# Patient Record
Sex: FEMALE | Race: WHITE | NOT HISPANIC OR LATINO
[De-identification: names, ages, dates, MRNs, and addresses within clinical notes are randomized per-mention and may not be internally consistent; named-entity substitution may affect disease eponyms.]

---

## 2017-01-27 ENCOUNTER — OTHER (OUTPATIENT)
Age: 72
End: 2017-01-27

## 2017-01-29 ENCOUNTER — FORM ENCOUNTER (OUTPATIENT)
Age: 72
End: 2017-01-29

## 2017-01-30 ENCOUNTER — OUTPATIENT (OUTPATIENT)
Dept: OUTPATIENT SERVICES | Facility: HOSPITAL | Age: 72
LOS: 1 days | End: 2017-01-30
Payer: MEDICARE

## 2017-01-30 ENCOUNTER — APPOINTMENT (OUTPATIENT)
Dept: MAMMOGRAPHY | Facility: CLINIC | Age: 72
End: 2017-01-30

## 2017-01-30 ENCOUNTER — APPOINTMENT (OUTPATIENT)
Dept: ULTRASOUND IMAGING | Facility: CLINIC | Age: 72
End: 2017-01-30

## 2017-01-30 DIAGNOSIS — N60.09 SOLITARY CYST OF UNSPECIFIED BREAST: Chronic | ICD-10-CM

## 2017-01-30 DIAGNOSIS — Z90.49 ACQUIRED ABSENCE OF OTHER SPECIFIED PARTS OF DIGESTIVE TRACT: Chronic | ICD-10-CM

## 2017-01-30 DIAGNOSIS — Z00.8 ENCOUNTER FOR OTHER GENERAL EXAMINATION: ICD-10-CM

## 2017-01-30 DIAGNOSIS — Z90.710 ACQUIRED ABSENCE OF BOTH CERVIX AND UTERUS: Chronic | ICD-10-CM

## 2017-01-30 PROCEDURE — 76641 ULTRASOUND BREAST COMPLETE: CPT

## 2017-01-30 PROCEDURE — 77067 SCR MAMMO BI INCL CAD: CPT

## 2017-01-30 PROCEDURE — 77063 BREAST TOMOSYNTHESIS BI: CPT

## 2017-01-31 ENCOUNTER — FORM ENCOUNTER (OUTPATIENT)
Age: 72
End: 2017-01-31

## 2017-01-31 ENCOUNTER — OTHER (OUTPATIENT)
Age: 72
End: 2017-01-31

## 2017-02-01 ENCOUNTER — APPOINTMENT (OUTPATIENT)
Dept: MAMMOGRAPHY | Facility: CLINIC | Age: 72
End: 2017-02-01

## 2017-02-01 ENCOUNTER — APPOINTMENT (OUTPATIENT)
Dept: ULTRASOUND IMAGING | Facility: CLINIC | Age: 72
End: 2017-02-01

## 2017-02-01 ENCOUNTER — OUTPATIENT (OUTPATIENT)
Dept: OUTPATIENT SERVICES | Facility: HOSPITAL | Age: 72
LOS: 1 days | End: 2017-02-01
Payer: MEDICARE

## 2017-02-01 DIAGNOSIS — Z90.710 ACQUIRED ABSENCE OF BOTH CERVIX AND UTERUS: Chronic | ICD-10-CM

## 2017-02-01 DIAGNOSIS — Z90.49 ACQUIRED ABSENCE OF OTHER SPECIFIED PARTS OF DIGESTIVE TRACT: Chronic | ICD-10-CM

## 2017-02-01 DIAGNOSIS — N60.09 SOLITARY CYST OF UNSPECIFIED BREAST: Chronic | ICD-10-CM

## 2017-02-01 DIAGNOSIS — R92.8 OTHER ABNORMAL AND INCONCLUSIVE FINDINGS ON DIAGNOSTIC IMAGING OF BREAST: ICD-10-CM

## 2017-02-01 PROCEDURE — 76642 ULTRASOUND BREAST LIMITED: CPT

## 2017-02-01 PROCEDURE — 77065 DX MAMMO INCL CAD UNI: CPT

## 2017-02-01 PROCEDURE — G0279: CPT

## 2017-02-02 ENCOUNTER — OTHER (OUTPATIENT)
Age: 72
End: 2017-02-02

## 2017-02-15 ENCOUNTER — RESULT REVIEW (OUTPATIENT)
Age: 72
End: 2017-02-15

## 2017-02-15 ENCOUNTER — APPOINTMENT (OUTPATIENT)
Dept: BREAST CENTER | Facility: CLINIC | Age: 72
End: 2017-02-15

## 2017-02-15 ENCOUNTER — FORM ENCOUNTER (OUTPATIENT)
Age: 72
End: 2017-02-15

## 2017-02-16 ENCOUNTER — APPOINTMENT (OUTPATIENT)
Dept: ULTRASOUND IMAGING | Facility: CLINIC | Age: 72
End: 2017-02-16

## 2017-02-16 ENCOUNTER — OUTPATIENT (OUTPATIENT)
Dept: OUTPATIENT SERVICES | Facility: HOSPITAL | Age: 72
LOS: 1 days | End: 2017-02-16
Payer: MEDICARE

## 2017-02-16 DIAGNOSIS — R92.8 OTHER ABNORMAL AND INCONCLUSIVE FINDINGS ON DIAGNOSTIC IMAGING OF BREAST: ICD-10-CM

## 2017-02-16 DIAGNOSIS — Z90.710 ACQUIRED ABSENCE OF BOTH CERVIX AND UTERUS: Chronic | ICD-10-CM

## 2017-02-16 DIAGNOSIS — Z90.49 ACQUIRED ABSENCE OF OTHER SPECIFIED PARTS OF DIGESTIVE TRACT: Chronic | ICD-10-CM

## 2017-02-16 DIAGNOSIS — N60.09 SOLITARY CYST OF UNSPECIFIED BREAST: Chronic | ICD-10-CM

## 2017-02-16 PROCEDURE — 77065 DX MAMMO INCL CAD UNI: CPT

## 2017-02-16 PROCEDURE — 76942 ECHO GUIDE FOR BIOPSY: CPT

## 2017-02-16 PROCEDURE — 19000 PUNCTURE ASPIR CYST BREAST: CPT

## 2017-02-23 ENCOUNTER — OTHER (OUTPATIENT)
Age: 72
End: 2017-02-23

## 2017-10-01 ENCOUNTER — FORM ENCOUNTER (OUTPATIENT)
Age: 72
End: 2017-10-01

## 2017-10-02 ENCOUNTER — OUTPATIENT (OUTPATIENT)
Dept: OUTPATIENT SERVICES | Facility: HOSPITAL | Age: 72
LOS: 1 days | End: 2017-10-02
Payer: MEDICARE

## 2017-10-02 ENCOUNTER — APPOINTMENT (OUTPATIENT)
Dept: MAMMOGRAPHY | Facility: CLINIC | Age: 72
End: 2017-10-02
Payer: MEDICARE

## 2017-10-02 DIAGNOSIS — Z90.49 ACQUIRED ABSENCE OF OTHER SPECIFIED PARTS OF DIGESTIVE TRACT: Chronic | ICD-10-CM

## 2017-10-02 DIAGNOSIS — Z90.710 ACQUIRED ABSENCE OF BOTH CERVIX AND UTERUS: Chronic | ICD-10-CM

## 2017-10-02 DIAGNOSIS — R92.8 OTHER ABNORMAL AND INCONCLUSIVE FINDINGS ON DIAGNOSTIC IMAGING OF BREAST: ICD-10-CM

## 2017-10-02 DIAGNOSIS — Z00.8 ENCOUNTER FOR OTHER GENERAL EXAMINATION: ICD-10-CM

## 2017-10-02 DIAGNOSIS — N60.09 SOLITARY CYST OF UNSPECIFIED BREAST: Chronic | ICD-10-CM

## 2017-10-02 PROCEDURE — 76642 ULTRASOUND BREAST LIMITED: CPT

## 2017-10-02 PROCEDURE — G0279: CPT | Mod: 26

## 2017-10-02 PROCEDURE — 76642 ULTRASOUND BREAST LIMITED: CPT | Mod: 26,LT

## 2017-10-02 PROCEDURE — 77065 DX MAMMO INCL CAD UNI: CPT

## 2017-10-02 PROCEDURE — G0206: CPT | Mod: 26,LT

## 2017-10-02 PROCEDURE — G0279: CPT

## 2017-10-31 ENCOUNTER — APPOINTMENT (OUTPATIENT)
Dept: BREAST CENTER | Facility: CLINIC | Age: 72
End: 2017-10-31

## 2018-02-06 ENCOUNTER — EMERGENCY (EMERGENCY)
Facility: HOSPITAL | Age: 73
LOS: 1 days | End: 2018-02-06
Payer: MEDICARE

## 2018-02-06 DIAGNOSIS — N60.09 SOLITARY CYST OF UNSPECIFIED BREAST: Chronic | ICD-10-CM

## 2018-02-06 DIAGNOSIS — Z90.710 ACQUIRED ABSENCE OF BOTH CERVIX AND UTERUS: Chronic | ICD-10-CM

## 2018-02-06 DIAGNOSIS — Z90.49 ACQUIRED ABSENCE OF OTHER SPECIFIED PARTS OF DIGESTIVE TRACT: Chronic | ICD-10-CM

## 2018-02-06 PROCEDURE — 71046 X-RAY EXAM CHEST 2 VIEWS: CPT | Mod: 26

## 2018-02-06 PROCEDURE — 99284 EMERGENCY DEPT VISIT MOD MDM: CPT

## 2019-01-30 ENCOUNTER — OUTPATIENT (OUTPATIENT)
Dept: OUTPATIENT SERVICES | Facility: HOSPITAL | Age: 74
LOS: 1 days | End: 2019-01-30

## 2019-01-30 DIAGNOSIS — Z90.49 ACQUIRED ABSENCE OF OTHER SPECIFIED PARTS OF DIGESTIVE TRACT: Chronic | ICD-10-CM

## 2019-01-30 DIAGNOSIS — Z90.710 ACQUIRED ABSENCE OF BOTH CERVIX AND UTERUS: Chronic | ICD-10-CM

## 2019-01-30 DIAGNOSIS — N60.09 SOLITARY CYST OF UNSPECIFIED BREAST: Chronic | ICD-10-CM

## 2020-03-20 ENCOUNTER — APPOINTMENT (OUTPATIENT)
Dept: UROGYNECOLOGY | Facility: CLINIC | Age: 75
End: 2020-03-20

## 2021-05-21 ENCOUNTER — APPOINTMENT (OUTPATIENT)
Dept: BREAST CENTER | Facility: CLINIC | Age: 76
End: 2021-05-21
Payer: MEDICARE

## 2021-05-21 VITALS
WEIGHT: 170 LBS | DIASTOLIC BLOOD PRESSURE: 78 MMHG | HEART RATE: 78 BPM | SYSTOLIC BLOOD PRESSURE: 130 MMHG | HEIGHT: 68 IN | BODY MASS INDEX: 25.76 KG/M2

## 2021-05-21 DIAGNOSIS — N64.4 MASTODYNIA: ICD-10-CM

## 2021-05-21 DIAGNOSIS — Z98.890 OTHER SPECIFIED POSTPROCEDURAL STATES: ICD-10-CM

## 2021-05-21 DIAGNOSIS — N60.19 DIFFUSE CYSTIC MASTOPATHY OF UNSPECIFIED BREAST: ICD-10-CM

## 2021-05-21 DIAGNOSIS — D64.9 ANEMIA, UNSPECIFIED: ICD-10-CM

## 2021-05-21 DIAGNOSIS — Z87.898 PERSONAL HISTORY OF OTHER SPECIFIED CONDITIONS: ICD-10-CM

## 2021-05-21 PROCEDURE — 99213 OFFICE O/P EST LOW 20 MIN: CPT

## 2021-05-21 RX ORDER — FLUTICASONE PROPION/SALMETEROL 500-50 MCG
BLISTER, WITH INHALATION DEVICE INHALATION
Refills: 0 | Status: ACTIVE | COMMUNITY

## 2021-05-21 RX ORDER — CYCLOSPORINE 0.5 MG/ML
0.05 EMULSION OPHTHALMIC
Refills: 0 | Status: ACTIVE | COMMUNITY

## 2021-05-21 NOTE — DATA REVIEWED
[FreeTextEntry1] : Bilateral mammogram ([place]) 3/2/2021:  Benign.  BIRADS 2\par \par (The images were reviewed and returned to the patient.)

## 2021-05-21 NOTE — PHYSICAL EXAM
[EOMI] : extra ocular movement intact [Sclera nonicteric] : sclera nonicteric [Supple] : supple [No Supraclavicular Adenopathy] : no supraclavicular adenopathy [No Cervical Adenopathy] : no cervical adenopathy [No Thyromegaly] : no thyromegaly [Clear to Auscultation Bilat] : clear to auscultation bilaterally [Normal Sinus Rhythm] : normal sinus rhythm [Examined in the supine and seated position] : examined in the supine and seated position [No dominant masses] : no dominant masses in right breast  [No dominant masses] : no dominant masses left breast [No Nipple Retraction] : no left nipple retraction [No Nipple Discharge] : no left nipple discharge [No Axillary Lymphadenopathy] : no left axillary lymphadenopathy [Soft] : abdomen soft [Not Tender] : non-tender [de-identified] : Radial scar 2:00 with underlying tenderness.  There is a slight depression in the center with fullness of the breast tissue surrounding of normal texture.

## 2021-05-21 NOTE — HISTORY OF PRESENT ILLNESS
[FreeTextEntry1] : This is a 76 year old  female who has been seen in the past for breast pain. Her last visit was in 2015.\par \par She started having pain in the left breast about 3 weeks ago that is sharper than in the past.  She then thought she felt a lump the size of a walnut in the sitting position.

## 2021-05-21 NOTE — PAST MEDICAL HISTORY
[Menarche Age ____] : age at menarche was [unfilled] [Menopause Age____] : age at menopause was [unfilled] [Total Preg ___] : G[unfilled] [Live Births ___] : P[unfilled]  [Age At Live Birth ___] : Age at live birth: [unfilled] [FreeTextEntry7] : BCP x 1 year, HRT x 1 year [FreeTextEntry8] : no

## 2021-09-17 ENCOUNTER — APPOINTMENT (OUTPATIENT)
Dept: BREAST CENTER | Facility: CLINIC | Age: 76
End: 2021-09-17

## 2021-11-05 ENCOUNTER — APPOINTMENT (OUTPATIENT)
Dept: BREAST CENTER | Facility: CLINIC | Age: 76
End: 2021-11-05
Payer: MEDICARE

## 2021-11-05 VITALS
HEART RATE: 93 BPM | WEIGHT: 160 LBS | BODY MASS INDEX: 23.97 KG/M2 | HEIGHT: 68.5 IN | DIASTOLIC BLOOD PRESSURE: 74 MMHG | SYSTOLIC BLOOD PRESSURE: 123 MMHG

## 2021-11-05 DIAGNOSIS — N64.4 MASTODYNIA: ICD-10-CM

## 2021-11-05 PROCEDURE — 99213 OFFICE O/P EST LOW 20 MIN: CPT

## 2021-11-05 NOTE — DATA REVIEWED
[FreeTextEntry1] : Bilateral mammogram ([place]) 3/2/2021:  Benign.  BIRADS 2\par \par Left breast ultrasound 6/4/2021: negative

## 2021-11-05 NOTE — HISTORY OF PRESENT ILLNESS
[FreeTextEntry1] : This is a 76 year old  female who has been seen in the past for breast pain. Her last visit was in 2015.\par \par She was seen in May for left breast pain.  She has chronic pain, but it seemed more intense.  She had a normal mammogram and was sent for an ultrasound that was also normal.  It subsided and is back to her baseline.\par \par However, she went to a wedding 5 weeks ago and got a severe upper respiratory infection (COVID neg) that caused a lot of coughing.  She has had stabbing pain in her upper outer right breast since then.

## 2021-11-05 NOTE — PHYSICAL EXAM
[EOMI] : extra ocular movement intact [Sclera nonicteric] : sclera nonicteric [Supple] : supple [No Supraclavicular Adenopathy] : no supraclavicular adenopathy [No Cervical Adenopathy] : no cervical adenopathy [No Thyromegaly] : no thyromegaly [Clear to Auscultation Bilat] : clear to auscultation bilaterally [Normal Sinus Rhythm] : normal sinus rhythm [Examined in the supine and seated position] : examined in the supine and seated position [No dominant masses] : no dominant masses in right breast  [No dominant masses] : no dominant masses left breast [No Nipple Retraction] : no left nipple retraction [No Nipple Discharge] : no left nipple discharge [No Axillary Lymphadenopathy] : no left axillary lymphadenopathy [Soft] : abdomen soft [Not Tender] : non-tender [de-identified] : Mild tenderness deep UOQ- possible chest wall. [de-identified] : Radial scar 2:00 with underlying tenderness.  There is a slight depression in the center with fullness of the breast tissue surrounding of normal texture.

## 2021-11-08 ENCOUNTER — EMERGENCY (EMERGENCY)
Facility: HOSPITAL | Age: 76
LOS: 1 days | End: 2021-11-08
Admitting: EMERGENCY MEDICINE
Payer: MEDICARE

## 2021-11-08 DIAGNOSIS — N60.09 SOLITARY CYST OF UNSPECIFIED BREAST: Chronic | ICD-10-CM

## 2021-11-08 DIAGNOSIS — Z90.49 ACQUIRED ABSENCE OF OTHER SPECIFIED PARTS OF DIGESTIVE TRACT: Chronic | ICD-10-CM

## 2021-11-08 DIAGNOSIS — Z90.710 ACQUIRED ABSENCE OF BOTH CERVIX AND UTERUS: Chronic | ICD-10-CM

## 2021-11-08 PROCEDURE — 93010 ELECTROCARDIOGRAM REPORT: CPT

## 2021-11-08 PROCEDURE — 71045 X-RAY EXAM CHEST 1 VIEW: CPT | Mod: 26

## 2021-11-08 PROCEDURE — 99284 EMERGENCY DEPT VISIT MOD MDM: CPT

## 2022-02-17 ENCOUNTER — OUTPATIENT (OUTPATIENT)
Dept: OUTPATIENT SERVICES | Facility: HOSPITAL | Age: 77
LOS: 1 days | End: 2022-02-17

## 2022-02-17 DIAGNOSIS — Z90.710 ACQUIRED ABSENCE OF BOTH CERVIX AND UTERUS: Chronic | ICD-10-CM

## 2022-02-17 DIAGNOSIS — N60.09 SOLITARY CYST OF UNSPECIFIED BREAST: Chronic | ICD-10-CM

## 2022-02-17 DIAGNOSIS — Z90.49 ACQUIRED ABSENCE OF OTHER SPECIFIED PARTS OF DIGESTIVE TRACT: Chronic | ICD-10-CM

## 2022-02-18 DIAGNOSIS — J44.9 CHRONIC OBSTRUCTIVE PULMONARY DISEASE, UNSPECIFIED: ICD-10-CM

## 2022-03-07 ENCOUNTER — APPOINTMENT (OUTPATIENT)
Dept: OPHTHALMOLOGY | Facility: CLINIC | Age: 77
End: 2022-03-07

## 2022-05-27 ENCOUNTER — APPOINTMENT (OUTPATIENT)
Dept: BREAST CENTER | Facility: CLINIC | Age: 77
End: 2022-05-27

## 2022-05-31 NOTE — HISTORY OF PRESENT ILLNESS
[FreeTextEntry1] : This is a 77 year old  female who has a history of breast pain. \par \par She was seen in May for left breast pain.  She has chronic pain, but it seemed more intense.  She had a normal mammogram and was sent for an ultrasound that was also normal.  It subsided and is back to her baseline.\par \par However, she went to a wedding 5 weeks ago and got a severe upper respiratory infection (COVID neg) that caused a lot of coughing.  She has had stabbing pain in her upper outer right breast since then.

## 2022-05-31 NOTE — DATA REVIEWED
[FreeTextEntry1] : Bilateral mammogram ([place]) 3/2/2021:  Benign.  BIRADS 2\par \par Left breast ultrasound 6/4/2021: negative

## 2022-05-31 NOTE — PHYSICAL EXAM
[EOMI] : extra ocular movement intact [Sclera nonicteric] : sclera nonicteric [Supple] : supple [No Supraclavicular Adenopathy] : no supraclavicular adenopathy [No Cervical Adenopathy] : no cervical adenopathy [No Thyromegaly] : no thyromegaly [Clear to Auscultation Bilat] : clear to auscultation bilaterally [Normal Sinus Rhythm] : normal sinus rhythm [Examined in the supine and seated position] : examined in the supine and seated position [No dominant masses] : no dominant masses in right breast  [No dominant masses] : no dominant masses left breast [No Nipple Retraction] : no left nipple retraction [No Nipple Discharge] : no left nipple discharge [No Axillary Lymphadenopathy] : no left axillary lymphadenopathy [Soft] : abdomen soft [Not Tender] : non-tender [de-identified] : Mild tenderness deep UOQ- possible chest wall. [de-identified] : Radial scar 2:00 with underlying tenderness.  There is a slight depression in the center with fullness of the breast tissue surrounding of normal texture.

## 2023-08-01 ENCOUNTER — RESULT REVIEW (OUTPATIENT)
Age: 78
End: 2023-08-01

## 2023-08-01 ENCOUNTER — APPOINTMENT (OUTPATIENT)
Dept: OBGYN | Facility: CLINIC | Age: 78
End: 2023-08-01
Payer: MEDICARE

## 2023-08-01 VITALS
DIASTOLIC BLOOD PRESSURE: 69 MMHG | HEIGHT: 68.5 IN | SYSTOLIC BLOOD PRESSURE: 122 MMHG | BODY MASS INDEX: 24.33 KG/M2 | WEIGHT: 162.38 LBS

## 2023-08-01 DIAGNOSIS — Z12.12 ENCOUNTER FOR SCREENING FOR MALIGNANT NEOPLASM OF RECTUM: ICD-10-CM

## 2023-08-01 DIAGNOSIS — Z12.4 ENCOUNTER FOR SCREENING FOR MALIGNANT NEOPLASM OF CERVIX: ICD-10-CM

## 2023-08-01 DIAGNOSIS — R19.5 OTHER FECAL ABNORMALITIES: ICD-10-CM

## 2023-08-01 DIAGNOSIS — R10.84 GENERALIZED ABDOMINAL PAIN: ICD-10-CM

## 2023-08-01 DIAGNOSIS — Z01.419 ENCOUNTER FOR GYNECOLOGICAL EXAMINATION (GENERAL) (ROUTINE) W/OUT ABNORMAL FINDINGS: ICD-10-CM

## 2023-08-01 PROCEDURE — 99397 PER PM REEVAL EST PAT 65+ YR: CPT | Mod: GY

## 2023-08-01 PROCEDURE — 81003 URINALYSIS AUTO W/O SCOPE: CPT | Mod: QW

## 2023-08-01 PROCEDURE — G0101: CPT

## 2023-08-01 PROCEDURE — 82274 ASSAY TEST FOR BLOOD FECAL: CPT | Mod: 59,QW

## 2023-08-02 PROBLEM — R10.84 GENERALIZED ABDOMINAL PAIN: Status: ACTIVE | Noted: 2023-08-02

## 2023-08-02 PROBLEM — R19.5 STOOL GUAIAC POSITIVE: Status: ACTIVE | Noted: 2023-08-02

## 2023-08-02 LAB
ALBUMIN SERPL ELPH-MCNC: 4.5 G/DL
ALP BLD-CCNC: 117 U/L
ALT SERPL-CCNC: 15 U/L
ANION GAP SERPL CALC-SCNC: 15 MMOL/L
AST SERPL-CCNC: 17 U/L
BILIRUB SERPL-MCNC: <0.2 MG/DL
BILIRUB UR QL STRIP: NORMAL
BUN SERPL-MCNC: 18 MG/DL
CALCIUM SERPL-MCNC: 10 MG/DL
CHLORIDE SERPL-SCNC: 99 MMOL/L
CO2 SERPL-SCNC: 28 MMOL/L
CREAT SERPL-MCNC: 0.63 MG/DL
DATE COLLECTED: NORMAL
EGFR: 92 ML/MIN/1.73M2
GLUCOSE SERPL-MCNC: 64 MG/DL
GLUCOSE UR-MCNC: NORMAL
HCG UR QL: 0.2 EU/DL
HEMOCCULT SP1 STL QL: POSITIVE
HGB UR QL STRIP.AUTO: NORMAL
KETONES UR-MCNC: NORMAL
LEUKOCYTE ESTERASE UR QL STRIP: ABNORMAL
NITRITE UR QL STRIP: NORMAL
PH UR STRIP: 6.5
POTASSIUM SERPL-SCNC: 4 MMOL/L
PROT SERPL-MCNC: 6.6 G/DL
PROT UR STRIP-MCNC: NORMAL
QUALITY CONTROL: YES
SODIUM SERPL-SCNC: 142 MMOL/L
SP GR UR STRIP: 1.02

## 2023-08-02 NOTE — REVIEW OF SYSTEMS
[Patient Intake Form Reviewed] : Patient intake form was reviewed [Abdominal Pain] : abdominal pain [Constipation] : constipation [Urgency] : urgency [Pelvic pain] : pelvic pain [Negative] : Heme/Lymph

## 2023-08-03 DIAGNOSIS — N95.2 POSTMENOPAUSAL ATROPHIC VAGINITIS: ICD-10-CM

## 2023-08-05 ENCOUNTER — NON-APPOINTMENT (OUTPATIENT)
Age: 78
End: 2023-08-05

## 2023-08-05 LAB
CYTOLOGY CVX/VAG DOC THIN PREP: ABNORMAL
HPV HIGH+LOW RISK DNA PNL CVX: NOT DETECTED

## 2023-08-09 ENCOUNTER — APPOINTMENT (OUTPATIENT)
Dept: CT IMAGING | Facility: CLINIC | Age: 78
End: 2023-08-09
Payer: MEDICARE

## 2023-08-09 PROCEDURE — 74177 CT ABD & PELVIS W/CONTRAST: CPT

## 2023-08-15 ENCOUNTER — NON-APPOINTMENT (OUTPATIENT)
Age: 78
End: 2023-08-15

## 2023-08-31 ENCOUNTER — APPOINTMENT (OUTPATIENT)
Dept: VASCULAR SURGERY | Facility: CLINIC | Age: 78
End: 2023-08-31
Payer: MEDICARE

## 2023-08-31 VITALS
WEIGHT: 162 LBS | TEMPERATURE: 98 F | DIASTOLIC BLOOD PRESSURE: 70 MMHG | OXYGEN SATURATION: 98 % | SYSTOLIC BLOOD PRESSURE: 128 MMHG | BODY MASS INDEX: 24.55 KG/M2 | HEIGHT: 68 IN | HEART RATE: 71 BPM

## 2023-08-31 DIAGNOSIS — I72.8 ANEURYSM OF OTHER SPECIFIED ARTERIES: ICD-10-CM

## 2023-08-31 PROCEDURE — 99203 OFFICE O/P NEW LOW 30 MIN: CPT

## 2023-08-31 RX ORDER — ESTRADIOL 10 UG/1
10 TABLET VAGINAL AT BEDTIME
Qty: 1 | Refills: 2 | Status: DISCONTINUED | COMMUNITY
Start: 2023-08-03 | End: 2023-08-31

## 2023-08-31 NOTE — HISTORY OF PRESENT ILLNESS
[FreeTextEntry1] : 79 yo female presents for evaluation of 1.5 cm splenic artery aneurysm. Pt has been having the aneurysm monitored since 2019 and has remained 1.5 cm.

## 2023-08-31 NOTE — ASSESSMENT
[FreeTextEntry1] : 79 yo female with stable 1.5 cm splenic artery aneurysms   Pt counseled on results of CT and above diagnosis.  Pt does not meet requirements for repair of splenic artery aneurysms RTC PRN  A total of 30 minutes was spent with patient and coordinating care

## 2023-09-08 NOTE — PHYSICAL EXAM
[Chaperone Present] : A chaperone was present in the examining room during all aspects of the physical examination [Appropriately responsive] : appropriately responsive [Alert] : alert [No Acute Distress] : no acute distress [No Lymphadenopathy] : no lymphadenopathy [Soft] : soft [Non-tender] : non-tender [Non-distended] : non-distended [No HSM] : No HSM [No Lesions] : no lesions [No Mass] : no mass [Oriented x3] : oriented x3 [Examination Of The Breasts] : a normal appearance [No Discharge] : no discharge [No Masses] : no breast masses were palpable [Labia Minora] : normal [Labia Majora] : normal [Atrophy] : atrophy [Dry Mucosa] : dry mucosa [Normal] : normal [Uterine Adnexae] : absent [Normal rectal exam] : was normal [Occult Blood Positive] : was positive for occult blood analysis [FreeTextEntry1] : Tami Duval MA

## 2023-09-08 NOTE — END OF VISIT
[FreeTextEntry3] : I, Tami Duval solely acted as a scribe for Dr. Monico Pastor on 08/01/2023 . All medical entries made by the scribe were at my, Dr. Pastor's, direction and personally dictated by me on 08/01/2023 . I have reviewed the chart and agree that the record accurately reflects my personal performance of the history, physical exam, assessment and plan. I have also personally directed, reviewed, and agreed with the chart.

## 2023-09-08 NOTE — DISCUSSION/SUMMARY
[FreeTextEntry1] : -Annual gynecology exam done today. Pap smear collected.  -Abdominal pain and bloating- Benign rectal exam. Hemoccult positive. Patient's previous colonoscopy screening was 1/2022 and was within normal limits per patients. Abdominal/ pelvic CT ordered.  -Vaginal discomfort- We discussed that her symptoms are likely due to atrophic vaginitis. We reviewed options for relief of vaginal dryness such as clarified coconut oil and vaginal estrogen. We discussed the use of vaginal estrogen with Vagifem and she agrees to try vaginal estrogen therapy. Prescription for Vagifem sent to the pharmacy. Instructions, precautions and side effects were discussed. All questions and concerns were addressed.  -CMP and CBC collected today.  -During this visit comprehensive counseling was given regarding the following concerns:  1. We discussed the need for proper nutrition and exercise. This includes cardiovascular and pelvic floor exercises.  2. We discussed the importance of maintaining proper vaccination schedule and we discussed the utility of the HPV, Influenza, Shingles, and TDaP vaccines.   3. We discussed the impact of chronic sun exposure and the risk for skin disease as a result. The benefits of a total body scan by a Dermatologist was reviewed.  4. We discussed the need for certain supplements for most people, which include Vitamin D3 and calcium rich foods with limitation on calcium supplementation by tabular form to 600 MG by mouth daily. As part of bone health program, we recommend weight bearing exercises and limited sun exposure (10-15 minutes daily) to help convert Vitamin D to its active form.  -She will follow up in 1 year or as needed.  -All questions and concerns were addressed.

## 2023-09-08 NOTE — HISTORY OF PRESENT ILLNESS
[N] : Patient is not sexually active [Y] : Positive pregnancy history [Menarche Age: ____] : age at menarche was [unfilled] [Previously active] : previously active [Men] : men [Mammogramdate] : 7/2023 [PapSmeardate] : 2019 [BoneDensityDate] : 2019 [TextBox_37] : OSTEOPENIA PER PT [ColonoscopyDate] : 1/2022 [TextBox_43] : WNL PER PT [LMPDate] : 1997 [PGHxTotal] : 4 [[facility]] : 4 [[facility]] : 4 [FreeTextEntry1] : 1997 Tissue Cultured Epidermal Autograft Text: The defect edges were debeveled with a #15 scalpel blade.  Given the location of the defect, shape of the defect and the proximity to free margins a tissue cultured epidermal autograft was deemed most appropriate.  The graft was then trimmed to fit the size of the defect.  The graft was then placed in the primary defect and oriented appropriately.

## 2024-11-06 ENCOUNTER — APPOINTMENT (OUTPATIENT)
Dept: BREAST CENTER | Facility: CLINIC | Age: 79
End: 2024-11-06

## 2025-05-20 ENCOUNTER — NON-APPOINTMENT (OUTPATIENT)
Age: 80
End: 2025-05-20

## 2025-05-22 ENCOUNTER — APPOINTMENT (OUTPATIENT)
Dept: BREAST CENTER | Facility: CLINIC | Age: 80
End: 2025-05-22
Payer: MEDICARE

## 2025-05-22 VITALS
SYSTOLIC BLOOD PRESSURE: 127 MMHG | WEIGHT: 165 LBS | DIASTOLIC BLOOD PRESSURE: 74 MMHG | BODY MASS INDEX: 24.72 KG/M2 | HEART RATE: 71 BPM | HEIGHT: 68.5 IN

## 2025-05-22 DIAGNOSIS — N64.4 MASTODYNIA: ICD-10-CM

## 2025-05-22 DIAGNOSIS — N60.19 DIFFUSE CYSTIC MASTOPATHY OF UNSPECIFIED BREAST: ICD-10-CM

## 2025-05-22 PROCEDURE — 99203 OFFICE O/P NEW LOW 30 MIN: CPT

## 2025-05-22 RX ORDER — TIOTROPIUM BROMIDE INHALATION SPRAY 1.56 UG/1
SPRAY, METERED RESPIRATORY (INHALATION)
Refills: 0 | Status: ACTIVE | COMMUNITY